# Patient Record
Sex: FEMALE
[De-identification: names, ages, dates, MRNs, and addresses within clinical notes are randomized per-mention and may not be internally consistent; named-entity substitution may affect disease eponyms.]

---

## 2017-06-12 ENCOUNTER — HOSPITAL ENCOUNTER (OUTPATIENT)
Dept: HOSPITAL 75 - PREOP | Age: 42
Discharge: HOME | End: 2017-06-12
Attending: OBSTETRICS & GYNECOLOGY
Payer: SELF-PAY

## 2017-06-12 VITALS — BODY MASS INDEX: 31.61 KG/M2 | HEIGHT: 60 IN | WEIGHT: 161 LBS

## 2017-06-12 VITALS — DIASTOLIC BLOOD PRESSURE: 86 MMHG | SYSTOLIC BLOOD PRESSURE: 122 MMHG

## 2017-06-12 DIAGNOSIS — Z01.812: Primary | ICD-10-CM

## 2017-06-12 DIAGNOSIS — Z11.2: ICD-10-CM

## 2017-06-12 DIAGNOSIS — C79.82: ICD-10-CM

## 2017-06-12 LAB
BASOPHILS # BLD AUTO: 0 10^3/UL (ref 0–0.1)
BASOPHILS NFR BLD AUTO: 0 % (ref 0–10)
EOSINOPHIL # BLD AUTO: 0.1 10^3/UL (ref 0–0.3)
EOSINOPHIL NFR BLD AUTO: 1 % (ref 0–10)
ERYTHROCYTE [DISTWIDTH] IN BLOOD BY AUTOMATED COUNT: 13 % (ref 10–14.5)
LYMPHOCYTES # BLD AUTO: 2.2 X 10^3 (ref 1–4)
LYMPHOCYTES NFR BLD AUTO: 24 % (ref 12–44)
MCH RBC QN AUTO: 28 PG (ref 25–34)
MCHC RBC AUTO-ENTMCNC: 35 G/DL (ref 32–36)
MCV RBC AUTO: 81 FL (ref 80–99)
MONOCYTES # BLD AUTO: 0.7 X 10^3 (ref 0–1)
MONOCYTES NFR BLD AUTO: 8 % (ref 0–12)
NEUTROPHILS # BLD AUTO: 6.2 X 10^3 (ref 1.8–7.8)
NEUTROPHILS NFR BLD AUTO: 67 % (ref 42–75)
PLATELET # BLD: 282 10^3/UL (ref 130–400)
PMV BLD AUTO: 11.2 FL (ref 7.4–10.4)
RBC # BLD AUTO: 4.9 10^6/UL (ref 4.35–5.85)
WBC # BLD AUTO: 9.2 10^3/UL (ref 4.3–11)

## 2017-06-12 PROCEDURE — 86901 BLOOD TYPING SEROLOGIC RH(D): CPT

## 2017-06-12 PROCEDURE — 86850 RBC ANTIBODY SCREEN: CPT

## 2017-06-12 PROCEDURE — 84703 CHORIONIC GONADOTROPIN ASSAY: CPT

## 2017-06-12 PROCEDURE — 86900 BLOOD TYPING SEROLOGIC ABO: CPT

## 2017-06-12 PROCEDURE — 85025 COMPLETE CBC W/AUTO DIFF WBC: CPT

## 2017-06-12 PROCEDURE — 36415 COLL VENOUS BLD VENIPUNCTURE: CPT

## 2017-06-12 PROCEDURE — 87081 CULTURE SCREEN ONLY: CPT

## 2017-06-15 ENCOUNTER — HOSPITAL ENCOUNTER (OUTPATIENT)
Dept: HOSPITAL 75 - SDC | Age: 42
Discharge: HOME | End: 2017-06-15
Attending: OBSTETRICS & GYNECOLOGY
Payer: COMMERCIAL

## 2017-06-15 VITALS — SYSTOLIC BLOOD PRESSURE: 118 MMHG | DIASTOLIC BLOOD PRESSURE: 89 MMHG

## 2017-06-15 VITALS — DIASTOLIC BLOOD PRESSURE: 72 MMHG | SYSTOLIC BLOOD PRESSURE: 125 MMHG

## 2017-06-15 VITALS — DIASTOLIC BLOOD PRESSURE: 73 MMHG | SYSTOLIC BLOOD PRESSURE: 122 MMHG

## 2017-06-15 VITALS — SYSTOLIC BLOOD PRESSURE: 128 MMHG | DIASTOLIC BLOOD PRESSURE: 79 MMHG

## 2017-06-15 VITALS — BODY MASS INDEX: 31.61 KG/M2 | HEIGHT: 60 IN | WEIGHT: 161 LBS

## 2017-06-15 DIAGNOSIS — D06.0: Primary | ICD-10-CM

## 2017-06-15 PROCEDURE — 94664 DEMO&/EVAL PT USE INHALER: CPT

## 2017-06-15 RX ADMIN — SODIUM CHLORIDE, SODIUM LACTATE, POTASSIUM CHLORIDE, AND CALCIUM CHLORIDE PRN MLS/HR: 600; 310; 30; 20 INJECTION, SOLUTION INTRAVENOUS at 11:41

## 2017-06-15 RX ADMIN — MORPHINE SULFATE PRN MG: 10 INJECTION, SOLUTION INTRAMUSCULAR; INTRAVENOUS at 11:49

## 2017-06-15 RX ADMIN — ONDANSETRON PRN MG: 2 INJECTION, SOLUTION INTRAMUSCULAR; INTRAVENOUS at 14:15

## 2017-06-15 RX ADMIN — MORPHINE SULFATE PRN MG: 10 INJECTION, SOLUTION INTRAMUSCULAR; INTRAVENOUS at 11:44

## 2017-06-15 RX ADMIN — ONDANSETRON PRN MG: 2 INJECTION, SOLUTION INTRAMUSCULAR; INTRAVENOUS at 14:18

## 2017-06-15 RX ADMIN — SODIUM CHLORIDE, SODIUM LACTATE, POTASSIUM CHLORIDE, AND CALCIUM CHLORIDE PRN MLS/HR: 600; 310; 30; 20 INJECTION, SOLUTION INTRAVENOUS at 09:00

## 2017-06-15 NOTE — DISCHARGE INST-WOMEN'S SERVICE
Discharge Inst-Women's Serv


Depart Medication/Instructions


New, Converted or Re-Newed RX:  RX on Chart





Consults/Follow Up


Additional Follow Up:  Yes


Orders/Referrals


Follow with Dr. Medina in 7-10 days





Activity


Activity:  Activity as Tolerated


Driving Instructions:  No Driving for 1 Week


NO SMOKING:  NO SMOKING


Nothing Inside Vagina:  No Douching, No [place], No Tampons





Diet


Discharge Diet:  No Restrictions


Symptoms to Report to :  Bleeding Excessive, Pain Increased, Fever Over 101 

Degrees F, Vaginal Bleeding Increase, Questions/Concerns


For Any Problems or Questions:  Contact Your Physician





Skin/Wound Care


Bathing Instructions:  Shower ( x 2 weeks)











SONIA MEDINA DO Raudel 15, 2017 10:42 am

## 2017-06-15 NOTE — PROGRESS NOTE-PRE OPERATIVE
Pre-Operative Progress Note


H&P Reviewed


The H&P was reviewed, patient examined and no changes noted.


Date Seen by Provider:  Raudel 15, 2017


Time Seen by Provider:  10:28


Date H&P Reviewed:  Raudel 15, 2017


Time H&P Reviewed:  10:30


Pre-Operative Diagnosis:  FORTUNATO 3, vs CIS











SONIA MEDINA DO Raudel 15, 2017 10:29 am

## 2017-06-15 NOTE — PROGRESS NOTE-POST OPERATIVE
Post-Operative Progess Note


Surgeon (s)/Assistant (s)


Surgeon


SONIA MEDINA DO


Assistant:  none





Pre-Operative Diagnosis


FORTUNATO 3, vs CIS





Post-Operative Diagnosis





same





Procedure & Operative Findings


Date of Procedure


6/15/17


Procedure Performed/Findings


Patient was taken to the operating room where general LMA anesthesia was found 

to be adequate.  She is placed in the dorsal lithotomy position prepped and 

draped in normal sterile fashion.  She's first examined under anesthesia the 

uterus is freely mobile and anteverted.  There is no adnexal fullness or masses 

appreciated on bimanual exam.  A weighted speculum was inserted to the patient'

s vagina a right angle retractor is used to visualize the cervix.  It is 

grasped with o'clock position using a long Allis clamp.  Paracervical block is 

performed at the 3 and 9 o'clock positions using quarter percent Marcaine with 

epinephrine, 5 ML's are injected at each site care is taken to aspirate before 

injecting.  I then placed lateral sutures at 3 and 9 o'clock position using 2-0 

Vicryl to help with hemostasis during biopsy.  Once these are in place I stain 

the cervix using Lugol's solution.  The transformation zone is not visualized 

thus there is no non-staining cells noted.  I then perform a circular incision 

to make a cone biopsy using a 12 knife, using the Cheney scissors I dissect down 

the cervical canal and amputate my cone biopsy proximally from the endocervix.  

I send this biopsy as cone biopsy.  The planes of dissection are made 

hemostatic using ball cautery.  I then placed Monsel's over my planes of 

dissection as well.  There is no active bleeding noted from any my dissection 

planes.  Blood loss was noted to be minimal.  The patient tolerated the 

procedure well as taken to recovery area in stable condition.  All other and 

she was removed from the patient.  Straight catheterization was performed after 

the procedure was complete.


Anesthesia Type


LMA gen





Estimated Blood Loss


Estimated blood loss (mL):  min





Specimens/Packing


Specimens Removed


cervical cone bx











SONIA MEDINA DO Raudel 15, 2017 11:54

## 2017-06-20 NOTE — XMS REPORT
[facility]

 Created on: 2016



Judy Scott

External Reference #: [id number]

: 1975

Sex: Female



Demographics







 Address  [address] [place]  [address]

 

 Home Phone  [phone]

 

 Preferred Language  Unknown

 

 Marital Status  Unknown

 

 Amish Affiliation  Unknown

 

 Race  

 

 Ethnic Group   or 





Author







 TAMARA Nicholas

 

 [facility]  [employer]

 

 Address  Unknown

 

 Phone  Unavailable







Care Team Providers







 Care Team Member Name  Role  Phone

 

 TAMARA KING  CP  Unavailable



                                                                



Allergies, Adverse Reactions, Alerts

          





 Substance  Reaction  Event Type

 

 N.K.D.A.  Info Not Available  Non Drug Allergy



                                                                               
         



Problems

          





 Problem Type  Condition  Code  Onset Dates  Condition Status

 

 Assessment  Dental examination  Z01.20     Active



                                                                               
         



Medications

          No Known Medications                                                 
                                       



Procedures

          





 Procedure  Coding System  Code  Date

 

 INTRAORL-PERIAPICAL 1 FILM 88453  CPT-4    2016

 

 LTD ORAL EVALUATION - PROBLEM FOCUS  CPT-4    2016



                                                                               
         



Results

          No Known Results                                                     
               



Summary Purpose

          [employer] Submission

## 2017-06-20 NOTE — XMS REPORT
[facility]

 Created on: 2016



Judy Scott

External Reference #: [id number]

: 1975

Sex: Female



Demographics







 Address  [address] [place]  [address]

 

 Home Phone  [phone]

 

 Preferred Language  Unknown

 

 Marital Status  Unknown

 

 Bahai Affiliation  Unknown

 

 Race  

 

 Ethnic Group   or 





Author







 Author  ELI NGUYEN

 

 Organization  [employer]

 

 Address  Unknown

 

 Phone  Unavailable







Care Team Providers







 Care Team Member Name  Role  Phone

 

 ELI NGUYEN  CP  Unavailable



                                                                



Allergies, Adverse Reactions, Alerts

          





 Substance  Reaction  Event Type

 

 N.K.D.A.  Info Not Available  Non Drug Allergy



                                                                               
         



Problems

          





 Problem Type  Condition  Code  Onset Dates  Condition Status

 

 Assessment  Palpitations  R00.2     Active

 

 Assessment  Well woman exam  Z01.419     Active



                                                                               
                   



Medications

          No Known Medications                                                 
                                       



Procedures

          





 Procedure  Coding System  Code  Date

 

 COMPLETE CBC W/AUTO DIFF WBC  CPT-4  91774  2016

 

 COMPREHEN METABOLIC PANEL  CPT-4  48364  2016

 

 VENIPUNCT, ROUTINE*  CPT-4  70369  2016

 

 Preventive Care New Pt. Age 40-64  CPT-4  84856  2016

 

 ASSAY THYROID STIM HORMONE  CPT-4  39814  2016



                                                                               
                                                           



Vital Signs

          





 Date/Time:  2016

 

 Blood Pressure Systolic  120 mmHg

 

 Cardiac Monitoring Heart Rate  80 bpm

 

 Weight  146 lbs

 

 Blood Pressure Diastolic  82 mmHg



                                                                    



Results

          





 Name  Result  Date  Reference Range  Unit  Abnormality Flag

 

 ROUTINE VENIPUNCTURE               



                                                                    



Summary Purpose

          [employer] Submission

## 2017-07-14 ENCOUNTER — HOSPITAL ENCOUNTER (OUTPATIENT)
Dept: HOSPITAL 75 - PREOP | Age: 42
Discharge: HOME | End: 2017-07-14
Attending: OBSTETRICS & GYNECOLOGY
Payer: COMMERCIAL

## 2017-07-14 VITALS — HEIGHT: 60 IN | BODY MASS INDEX: 31.61 KG/M2 | WEIGHT: 161 LBS

## 2017-07-14 VITALS — SYSTOLIC BLOOD PRESSURE: 115 MMHG | DIASTOLIC BLOOD PRESSURE: 80 MMHG

## 2017-07-14 DIAGNOSIS — D06.9: ICD-10-CM

## 2017-07-14 DIAGNOSIS — Z01.812: Primary | ICD-10-CM

## 2017-07-14 DIAGNOSIS — Z11.2: ICD-10-CM

## 2017-07-14 LAB
BASOPHILS # BLD AUTO: 0 10^3/UL (ref 0–0.1)
BASOPHILS NFR BLD AUTO: 0 % (ref 0–10)
EOSINOPHIL # BLD AUTO: 0.1 10^3/UL (ref 0–0.3)
EOSINOPHIL NFR BLD AUTO: 1 % (ref 0–10)
ERYTHROCYTE [DISTWIDTH] IN BLOOD BY AUTOMATED COUNT: 13.1 % (ref 10–14.5)
LYMPHOCYTES # BLD AUTO: 1.9 X 10^3 (ref 1–4)
LYMPHOCYTES NFR BLD AUTO: 23 % (ref 12–44)
MCH RBC QN AUTO: 28 PG (ref 25–34)
MCHC RBC AUTO-ENTMCNC: 35 G/DL (ref 32–36)
MCV RBC AUTO: 81 FL (ref 80–99)
MONOCYTES # BLD AUTO: 0.6 X 10^3 (ref 0–1)
MONOCYTES NFR BLD AUTO: 8 % (ref 0–12)
NEUTROPHILS # BLD AUTO: 5.4 X 10^3 (ref 1.8–7.8)
NEUTROPHILS NFR BLD AUTO: 67 % (ref 42–75)
PLATELET # BLD: 287 10^3/UL (ref 130–400)
PMV BLD AUTO: 10.9 FL (ref 7.4–10.4)
RBC # BLD AUTO: 4.95 10^6/UL (ref 4.35–5.85)
WBC # BLD AUTO: 7.9 10^3/UL (ref 4.3–11)

## 2017-07-14 PROCEDURE — 86850 RBC ANTIBODY SCREEN: CPT

## 2017-07-14 PROCEDURE — 87081 CULTURE SCREEN ONLY: CPT

## 2017-07-14 PROCEDURE — 85025 COMPLETE CBC W/AUTO DIFF WBC: CPT

## 2017-07-14 PROCEDURE — 86900 BLOOD TYPING SEROLOGIC ABO: CPT

## 2017-07-14 PROCEDURE — 36415 COLL VENOUS BLD VENIPUNCTURE: CPT

## 2017-07-14 PROCEDURE — 86901 BLOOD TYPING SEROLOGIC RH(D): CPT

## 2017-07-21 ENCOUNTER — HOSPITAL ENCOUNTER (OUTPATIENT)
Dept: HOSPITAL 75 - SDC | Age: 42
Discharge: HOME | End: 2017-07-21
Attending: OBSTETRICS & GYNECOLOGY
Payer: COMMERCIAL

## 2017-07-21 VITALS — DIASTOLIC BLOOD PRESSURE: 69 MMHG | SYSTOLIC BLOOD PRESSURE: 103 MMHG

## 2017-07-21 VITALS — DIASTOLIC BLOOD PRESSURE: 66 MMHG | SYSTOLIC BLOOD PRESSURE: 106 MMHG

## 2017-07-21 VITALS — DIASTOLIC BLOOD PRESSURE: 88 MMHG | SYSTOLIC BLOOD PRESSURE: 119 MMHG

## 2017-07-21 VITALS — WEIGHT: 161 LBS | BODY MASS INDEX: 31.61 KG/M2 | HEIGHT: 60 IN

## 2017-07-21 VITALS — DIASTOLIC BLOOD PRESSURE: 70 MMHG | SYSTOLIC BLOOD PRESSURE: 116 MMHG

## 2017-07-21 DIAGNOSIS — D25.1: ICD-10-CM

## 2017-07-21 DIAGNOSIS — N80.0: ICD-10-CM

## 2017-07-21 DIAGNOSIS — N87.1: Primary | ICD-10-CM

## 2017-07-21 DIAGNOSIS — N73.6: ICD-10-CM

## 2017-07-21 DIAGNOSIS — N83.8: ICD-10-CM

## 2017-07-21 DIAGNOSIS — N70.11: ICD-10-CM

## 2017-07-21 PROCEDURE — 96375 TX/PRO/DX INJ NEW DRUG ADDON: CPT

## 2017-07-21 PROCEDURE — 36415 COLL VENOUS BLD VENIPUNCTURE: CPT

## 2017-07-21 PROCEDURE — 94664 DEMO&/EVAL PT USE INHALER: CPT

## 2017-07-21 PROCEDURE — 84703 CHORIONIC GONADOTROPIN ASSAY: CPT

## 2017-07-21 PROCEDURE — 96361 HYDRATE IV INFUSION ADD-ON: CPT

## 2017-07-21 RX ADMIN — SODIUM CHLORIDE, SODIUM LACTATE, POTASSIUM CHLORIDE, AND CALCIUM CHLORIDE PRN MLS/HR: 600; 310; 30; 20 INJECTION, SOLUTION INTRAVENOUS at 07:23

## 2017-07-21 RX ADMIN — SODIUM CHLORIDE, SODIUM LACTATE, POTASSIUM CHLORIDE, AND CALCIUM CHLORIDE PRN MLS/HR: 600; 310; 30; 20 INJECTION, SOLUTION INTRAVENOUS at 08:58

## 2017-07-21 RX ADMIN — MORPHINE SULFATE PRN MG: 10 INJECTION, SOLUTION INTRAMUSCULAR; INTRAVENOUS at 09:50

## 2017-07-21 RX ADMIN — MORPHINE SULFATE PRN MG: 10 INJECTION, SOLUTION INTRAMUSCULAR; INTRAVENOUS at 09:55

## 2017-07-21 NOTE — PROGRESS NOTE-PRE OPERATIVE
Pre-Operative Progress Note


H&P Reviewed


The H&P was reviewed, patient examined and no changes noted.


Date Seen by Provider:  Jul 21, 2017


Time Seen by Provider:  07:15


Date H&P Reviewed:  Jul 21, 2017


Time H&P Reviewed:  07:15


Pre-Operative Diagnosis:  CIS of cervix with + margins











SONIA MEDINA DO Jul 21, 2017 7:22 am

## 2017-07-21 NOTE — PROGRESS NOTE-POST OPERATIVE
Post-Operative Progess Note


Surgeon (s)/Assistant (s)


Surgeon


SONIA MEDINA DO


Assistant:  Pearl Cabrera





Pre-Operative Diagnosis


CIS of Cervix with + Margins





Post-Operative Diagnosis





same plus extensive adhesions of the Vesicouterine pouch and anterior


abdominal wall to the uterus





Procedure & Operative Findings


Date of Procedure


7/21/17


Procedure Performed/Findings


RATLH with bilateral salpingectomy, and >30 min CHEMA





UOP: 35 concentrated


Fluids 1150


EBL: 50 mL





Findings: A normal-size uterus is hyperemic with dense anterior cul-de-sac 

adhesions, as well as anterior abdominal wall midline adhesions.  Grossly normal

-appearing bilateral ovaries. 





Procedure in detail:


The patient taken the operating room where general anesthesia was found to be 

adequate.  She is placed in dorsal lithotomy position prepped and draped in 

normal sterile fashion.  She is first examined under anesthesia the uterus is 

fixed anteverted, no adnexal masses are appreciated.  A weighted speculum was 

inserted patient's vagina after Wills catheter is placed using sterile 

technique.  A right angle retractor is used to visualize the cervix.  This 

grasped at the 12 o'clock position using a long Allis clamp.  A 0 Vicryl 

sutures placed the anterior lip of the cervix and uses my retraction point.  

The Allises removed.  I didn't send uterine cavity depth which is found to be 8 

cm.  I selected a 8 cm Nilsa uterine manipulator tip, and a 3.5 cm colpotomy 

ring.  The remaining is and placed into the endometrial cavity the balloon was 

deployed and the colpotomy ring is advanced around the vaginal fornix.  I then 

removal all of the other instruments from the patient's vagina and taken back 

into the abdomen where infraumbilically I infiltrated 0 using quarter percent 

Marcaine and make an 8 mm incision using the knife.  I directed varies needle 

through this incision until intraperitoneal placement was confirmed using a 

saline drop test.  An opening pressure of 4 mmHg is noted using CO2 gas I 

insufflate the peritoneal cavity to a pressure of 15 mmHg at which point I 

removed the varies needle and introduce an 8 mm blunt da Mariana camera trocar.  

Once this is in place unable to confirm intraperitoneal placement using the da 

Mariana laparoscope.  I then had the patient placed in steep Trendelenburg and am 

able to visualize all of the adequate structures to perform the procedure as 

planned.  There is no evidence of damage upon my entry site.  I then direct 2 

more trochars through the abdominal wall using both 8 mm trochars placed 

approximately 10 cm lateral to my infraumbilical trocar.  Incisions are made 

using the knife, and the trochars are directed under direct visualization of 

the laparoscope.  Once these are in place I bring in the da Mariana robot and 

docked in appropriate fashion.  I placed the vessel sealer and the left and 

monopolar radha in the right hand.   I take down the adhesion carefully and 

meticulously down the anterior abdominal wall and bladder, gently  

them from the uterus. I then performed the following dissection bilaterally.  

Starting at the utero-ovarian ligament I bipolar cauterized this and transected 

using the vessel sealer.  I then grasped the round ligament bipolar cauterized 

this and transected using the vessel sealer.  I grasp the mesosalpinx using the 

vessel sealer and separate  the fallopian tube from it's blood supply using the 

vessel sealer.  I am then able to grasp the entire broad ligament bipolar 

cauterized and transected using the vessel sealer down to the level of the 

lower uterine segment.  At this point a separate anterior posterior leaflets, 

the anterior leaf was taken around to the anterior vaginal fornix, and the 

posterior leaflets taken onto the posterior vaginal fornix.  This allows me to 

visualize the uterine vessels laterally which I bipolar cauterized and 

transected using the vessel sealer.  I did perform a colpotomy at the 12 o'

clock position using monopolar radha were unable to visualize the Nilsa 

colpotomy ring.  I take this incision circumferentially around the colpotomy 

ring using the monopolar radha amputating the cervix with the vaginal fornix.  

The entire specimen was then removed through the vagina.  I then proceeded to 

closing the vaginal cuff and the lateral vaginal apices i use 2-0 Vicryl suture 

in a figure-of-eight fashion couple suspending them to the uterosacral 

ligaments.  I then closed the remainder the vaginal cuff using 20V lock in a 

running fashion.  There is no active bleeding noted from any my dissection 

planes at which point I undocked the da Mariana robot and proceed with the 

remainder of the case laparoscopically.  Once again I copiously irrigated the 

pelvis using normal saline as no active bleeding noted I placed FloSeal 

hemostatic agent overall my planes of dissection to ensure excellent 

postoperative hemostasis.  I then had the patient flattened out and remove the 

lateral trochars under direct visualization of the laparoscope.  The 

infraumbilical trocar is used to release insufflation, and introduce 10 mL's of 

0.25% Marcaine into the peritoneal cavity for postoperative pain management.  

The skin incisions are then closed using 4-0 Monocryl and interrupted 

subcuticular stitches and Dermabond and Band-Aids are placed over these.


Anesthesia Type


GETA





Estimated Blood Loss


Estimated blood loss (mL):  50





Specimens/Packing


Specimens Removed


uterus and tubes











SONIA MEDINA DO Jul 21, 2017 9:25 am